# Patient Record
Sex: FEMALE | URBAN - METROPOLITAN AREA
[De-identification: names, ages, dates, MRNs, and addresses within clinical notes are randomized per-mention and may not be internally consistent; named-entity substitution may affect disease eponyms.]

---
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
## 2024-11-21 NOTE — PROCEDURE: DIAGNOSIS COMMENT
Comment: last lesion treated resolved; new lesion present adjacent
Render Risk Assessment In Note?: no
Detail Level: Simple
Home